# Patient Record
Sex: MALE | Race: BLACK OR AFRICAN AMERICAN
[De-identification: names, ages, dates, MRNs, and addresses within clinical notes are randomized per-mention and may not be internally consistent; named-entity substitution may affect disease eponyms.]

---

## 2017-10-18 ENCOUNTER — HOSPITAL ENCOUNTER (INPATIENT)
Dept: HOSPITAL 45 - C.EDB | Age: 22
LOS: 2 days | Discharge: HOME | DRG: 812 | End: 2017-10-20
Attending: FAMILY MEDICINE | Admitting: HOSPITALIST
Payer: COMMERCIAL

## 2017-10-18 VITALS
HEIGHT: 72 IN | WEIGHT: 174.61 LBS | HEIGHT: 72 IN | BODY MASS INDEX: 23.65 KG/M2 | WEIGHT: 174.61 LBS | BODY MASS INDEX: 23.65 KG/M2

## 2017-10-18 DIAGNOSIS — F17.200: ICD-10-CM

## 2017-10-18 DIAGNOSIS — D50.0: Primary | ICD-10-CM

## 2017-10-18 DIAGNOSIS — F98.8: ICD-10-CM

## 2017-10-18 LAB
ALBUMIN/GLOB SERPL: 1.2 {RATIO} (ref 0.9–2)
ALP SERPL-CCNC: 53 U/L (ref 45–117)
ALT SERPL-CCNC: 14 U/L (ref 12–78)
ANION GAP SERPL CALC-SCNC: 6 MMOL/L (ref 3–11)
ANISOCYTOSIS BLD QL SMEAR: PRESENT
AST SERPL-CCNC: 21 U/L (ref 15–37)
BASOPHILS # BLD: 0.03 K/UL (ref 0–0.2)
BASOPHILS NFR BLD: 0.4 %
BUN SERPL-MCNC: 8 MG/DL (ref 7–18)
BUN/CREAT SERPL: 8.1 (ref 10–20)
CALCIUM SERPL-MCNC: 9.2 MG/DL (ref 8.5–10.1)
CHLORIDE SERPL-SCNC: 105 MMOL/L (ref 98–107)
CO2 SERPL-SCNC: 26 MMOL/L (ref 21–32)
COMPLETE: YES
CREAT CL PREDICTED SERPL C-G-VRATE: 132.5 ML/MIN
CREAT SERPL-MCNC: 0.97 MG/DL (ref 0.6–1.4)
DACRYOCYTES BLD QL SMEAR: (no result)
EOSINOPHIL NFR BLD AUTO: 255 K/UL (ref 130–400)
GLOBULIN SER-MCNC: 3.6 GM/DL (ref 2.5–4)
GLUCOSE SERPL-MCNC: 96 MG/DL (ref 70–99)
HCT VFR BLD CALC: 25.2 % (ref 42–52)
HYPOCHROMIA BLD QL SMEAR: PRESENT
IG%: 0.3 %
IMM GRANULOCYTES NFR BLD AUTO: 38.6 %
LG PLATELETS BLD QL SMEAR: (no result)
LYME DISEASE AB IGG: (no result)
LYME DISEASE AB IGM: (no result)
LYMPHOCYTES # BLD: 2.73 K/UL (ref 1.2–3.4)
MAGNESIUM SERPL-MCNC: 2 MG/DL (ref 1.8–2.4)
MCH RBC QN AUTO: 13.2 PG (ref 25–34)
MCHC RBC AUTO-ENTMCNC: 25.8 G/DL (ref 32–36)
MCV RBC AUTO: 51.1 FL (ref 80–100)
MICROCYTES BLD QL SMEAR: PRESENT
MONOCYTES NFR BLD: 11.6 %
NEUTROPHILS # BLD AUTO: 2.1 %
NEUTROPHILS NFR BLD AUTO: 47 %
OVALOCYTES BLD QL SMEAR: (no result)
POIKILOCYTOSIS BLD QL SMEAR: PRESENT
POLYCHROMASIA BLD QL SMEAR: (no result)
POTASSIUM SERPL-SCNC: 3.5 MMOL/L (ref 3.5–5.1)
RBC # BLD AUTO: 4.93 M/UL (ref 4.7–6.1)
SCHISTOCYTES BLD QL SMEAR: (no result)
SODIUM SERPL-SCNC: 137 MMOL/L (ref 136–145)
TARGETS BLD QL SMEAR: (no result)
WBC # BLD AUTO: 7.08 K/UL (ref 4.8–10.8)

## 2017-10-18 NOTE — DIAGNOSTIC IMAGING REPORT
CT SCAN OF THE CERVICAL SPINE



CLINICAL HISTORY: Syncope. Neck pain.



COMPARISON STUDY:  No priors.



TECHNIQUE: CT scan of the cervical spine is performed from the skull base to the

upper thoracic spine. Images are reviewed in the axial, sagittal, and coronal

planes. IV contrast was not administered for this examination.  A dose lowering

technique was utilized adhering to the principles of ALARA.



CT DOSE: 1042.30 mGy.cm



FINDINGS:



Skeletal structures: The skeletal structures are well mineralized. There is no

evidence of fracture or subluxation involving the cervical spine. Vertebral body

height and alignment are maintained.  There is straightening of the cervical

lordosis with reversal centered at C4-C5. The odontoid process and lateral

masses are intact. The atlantoaxial articulation is preserved. The spinous

processes appear intact.



Intervertebral discs: The disc spaces are well maintained.



Central canal: Widely patent.



Soft tissues: The prevertebral and paraspinous soft tissues are within normal

limits.



Calvarium: The visualized calvarium at the skull base appears intact.



Brain parenchyma: Partially visualized brain parenchyma the skull base is within

normal limits.



Sinuses and mastoids: Trace mucosal thickening is seen in the maxillary antra.

The mastoid air cells are well pneumatized.



Lung apices: Clear as visualized.





IMPRESSION: There is no evidence of fracture or subluxation involving the

cervical spine.







Electronically signed by:  Arnold Giron M.D.

10/18/2017 10:46 PM



Dictated Date/Time:  10/18/2017 10:44 PM

## 2017-10-18 NOTE — DIAGNOSTIC IMAGING REPORT
CT SCAN OF THE BRAIN WITHOUT IV CONTRAST



CLINICAL HISTORY: Syncope. Headache.



COMPARISON STUDY:  No priors.



TECHNIQUE: Unenhanced axial CT scan of the brain is performed from the vertex to

the skull base.  A dose lowering technique was utilized adhering to the

principles of ALARA.



FINDINGS:



Brain parenchyma: The brain parenchyma is normal in appearance. There is no

hemorrhage, mass effect, or evidence of acute territorial ischemia by CT

criteria. Gray-white matter is preserved. No extra-axial fluid collection is

seen.



Ventricles, sulci, cisterns: Normal in configuration.



Intracranial vasculature: The visualized intracranial vasculature at the skull

base is normal in appearance.



Calvarium: There is no depressed calvarial fracture.



Sinuses and mastoids: The visualized paranasal sinuses are clear. The mastoid

air cells are well pneumatized.



Orbits: The bony orbits are grossly intact.





IMPRESSION: No acute intracranial abnormality.







Electronically signed by:  Arnold Giron M.D.

10/18/2017 10:43 PM



Dictated Date/Time:  10/18/2017 10:42 PM

## 2017-10-18 NOTE — HISTORY AND PHYSICAL
History & Physical


Date & Time of Service:


Oct 18, 2017 at 23:29


Chief Complaint:


Passing Out,Light Headed,Headache


Primary Care Physician:


No Doctor, Assigned


History of Present Illness


Source:  patient


This is a 23 yo m that is presenting to us after a syncopal episode which 

occurred yesterday. He states he was at a friend's house yesterday watching a 

basketball game. He had sudden onset dizziness while sitting in a chair and 

when he got up he had a syncopal episode. According to his friends he had been 

unconscious for 3-5 minutes and had no abnormal muscle movements. He awoke 

without confusion. Since this episode he has been feeling generally unwell with 

a mild headache and dizziness. Since this is ongoing he decided to come to the 

ED. The patient states that approx a year ago he started with vomiting 

frequently and there was no known source found for this vomiting. He states 

that sometimes he would have blood tinged saliva if he threw up and his stomach 

was empty. He also notes that this resolved over the summer but now he was 

suffering from different symptoms. Over the past few months he has had 

decreased exercise tolerance and in particular he has been unable to walk to 

class without shortness of breath. He has had no bloody stools but notes that 

his stools are darker in colour. He has no significant past medical history. 

Smokes infrequently and does not drink alcohol.





Past Medical/Surgical History


no known past medical history





Family History





Patient reports no known family medical history.





Social History


Smoking Status:  Current Some Day Smoker


Smokeless Tobacco Use:  No


Alcohol Use:  none


Drug Use:  marijuana


Marital Status:  single


Housing status:  lives with friends


Occupational Status:  [employer] student





Immunizations


History of Influenza Vaccine:  Unknown


History of Tetanus Vaccine?:  Unknown


History of Pneumococcal:  Unknown


History of Hepatitis B Vaccine:  Unknown





Multi-Drug Resistant Organisms


History of MDRO:  No





Allergies


Coded Allergies:  


     No Known Allergies (Unverified , 10/18/17)





Home Medications


Scheduled


Lisdexamfetamine Dimesylate (Vyvanse), 60 MG PO DAILY





Review of Systems


Constitutional:  No fever, No chills, No sweats


Eyes:  No worsening of vision, No eye pain


ENT:  No hearing loss


Respiratory:  + shortness of breath, + dyspnea on exertion, No cough, No sputum

, No wheezing, No dyspnea at rest


Cardiovascular:  No chest pain


Abdomen:  + nausea, + vomiting, + GI bleeding, No pain, No diarrhea, No 

constipation


Musculoskeletal:  No joint pain, No muscle pain


Genitourinary - Male:  No hematuria, No dysuria


Neurologic:  + weakness, No numbness/tingling, No balance problems


Psychiatric:  No depression symptoms, No anxiety


Endocrine:  + fatigue


Hematologic / Lymphatic:  No abnormal bleeding/bruising


Integumentary:  No rash, No new/changing skin lesions





Physical Exam


Vital Signs











  Date Time  Temp Pulse Resp B/P (MAP) Pulse Ox O2 Delivery O2 Flow Rate FiO2


 


10/18/17 23:14  77      


 


10/18/17 23:04     99 Room Air  


 


10/18/17 23:04  91 16 122/77 99 Room Air  


 


10/18/17 22:23  65  136/54 98 Room Air  





  72  114/74    





  80  103/74    


 


10/18/17 21:52 37.2 60 18 117/59 100 Room Air  








General Appearance:  no apparent distress


Head:  normocephalic, atraumatic


Eyes:  normal inspection


ENT:  normal ENT inspection


Neck:  supple


Respiratory/Chest:  normal breath sounds, no respiratory distress, no accessory 

muscle use


Cardiovascular:  regular rate, rhythm, no murmur, normal peripheral pulses


Abdomen/GI:  normal bowel sounds, non tender, soft, + fecal occult blood (per 

ED evaluation)


Back:  normal inspection


Extremities/Musculoskelatal:  no calf tenderness, no pedal edema, normal range 

of motion


Neurologic/Psych:  no motor/sensory deficits, alert, normal mood/affect, 

oriented x 3


Skin:  normal color, warm/dry, no rash


Lymphatic:  no adenopathy





Diagnostics


Laboratory Results





Results Past 24 Hours








Test


  10/18/17


22:17 Range/Units


 


 


White Blood Count 7.08 4.8-10.8  K/uL


 


Red Blood Count 4.93 4.7-6.1  M/uL


 


Hemoglobin 6.5 14.0-18.0  g/dL


 


Hematocrit 25.2 42-52  %


 


Mean Corpuscular Volume 51.1   fL


 


Mean Corpuscular Hemoglobin 13.2 25-34  pg


 


Mean Corpuscular Hemoglobin


Concent 25.8


  32-36  g/dl


 


 


Platelet Count 255 130-400  K/uL


 


Neutrophils (%) (Auto) 47.0  %


 


Lymphocytes (%) (Auto) 38.6  %


 


Monocytes (%) (Auto) 11.6  %


 


Eosinophils (%) (Auto) 2.1  %


 


Basophils (%) (Auto) 0.4  %


 


Neutrophils # (Auto) 3.33 1.4-6.5  K/uL


 


Lymphocytes # (Auto) 2.73 1.2-3.4  K/uL


 


Monocytes # (Auto) 0.82 0.11-0.59  K/uL


 


Eosinophils # (Auto) 0.15 0-0.5  K/uL


 


Basophils # (Auto) 0.03 0-0.2  K/uL


 


RDW Standard Deviation 37.6 36.4-46.3  fL


 


RDW Coefficient of Variation 20.6 11.5-14.5  %


 


Immature Granulocyte % (Auto) 0.3  %


 


Immature Granulocyte # (Auto) 0.02 0.00-0.02  K/uL


 


Large Platelets 1+  


 


Polychromasia 1+  


 


Hypochromasia PRESENT  


 


Poikilocytosis PRESENT  


 


Anisocytosis PRESENT  


 


Microcytosis PRESENT  


 


Target Cells 1+  


 


Tear Drop Cells 1+  


 


Ovalocytes 1+  


 


Schistocytes 1+  


 


Sodium Level 137 136-145  mmol/L


 


Potassium Level 3.5 3.5-5.1  mmol/L


 


Chloride Level 105   mmol/L


 


Carbon Dioxide Level 26 21-32  mmol/L


 


Anion Gap 6.0 3-11  mmol/L


 


Blood Urea Nitrogen 8 7-18  mg/dl


 


Creatinine


  0.97


  0.60-1.40


mg/dl


 


Est Creatinine Clear Calc


Drug Dose 132.5


   ml/min


 


 


Estimated GFR (


American) 129.7


   


 


 


Estimated GFR (Non-


American 111.9


   


 


 


BUN/Creatinine Ratio 8.1 10-20  


 


Random Glucose 96 70-99  mg/dl


 


Calcium Level 9.2 8.5-10.1  mg/dl


 


Magnesium Level 2.0 1.8-2.4  mg/dl


 


Total Bilirubin 0.4 0.2-1  mg/dl


 


Aspartate Amino Transf


(AST/SGOT) 21


  15-37  U/L


 


 


Alanine Aminotransferase


(ALT/SGPT) 14


  12-78  U/L


 


 


Alkaline Phosphatase 53   U/L


 


Troponin I < 0.015 0-0.045  ng/ml


 


Total Protein 8.1 6.4-8.2  gm/dl


 


Albumin 4.5 3.4-5.0  gm/dl


 


Globulin 3.6 2.5-4.0  gm/dl


 


Albumin/Globulin Ratio 1.2 0.9-2  


 


Lyme Disease IgG Antibody NEG NEG  


 


Lyme Disease IgM Antibody NEG NEG  











Diagnostic Radiology


CT SCAN OF THE CERVICAL SPINE





CLINICAL HISTORY: Syncope. Neck pain.





COMPARISON STUDY:  No priors.





TECHNIQUE: CT scan of the cervical spine is performed from the skull base to the


upper thoracic spine. Images are reviewed in the axial, sagittal, and coronal


planes. IV contrast was not administered for this examination.  A dose lowering


technique was utilized adhering to the principles of ALARA.





CT DOSE: 1042.30 mGy.cm





FINDINGS:





Skeletal structures: The skeletal structures are well mineralized. There is no


evidence of fracture or subluxation involving the cervical spine. Vertebral body


height and alignment are maintained.  There is straightening of the cervical


lordosis with reversal centered at C4-C5. The odontoid process and lateral


masses are intact. The atlantoaxial articulation is preserved. The spinous


processes appear intact.





Intervertebral discs: The disc spaces are well maintained.





Central canal: Widely patent.





Soft tissues: The prevertebral and paraspinous soft tissues are within normal


limits.





Calvarium: The visualized calvarium at the skull base appears intact.





Brain parenchyma: Partially visualized brain parenchyma the skull base is within


normal limits.





Sinuses and mastoids: Trace mucosal thickening is seen in the maxillary antra.


The mastoid air cells are well pneumatized.





Lung apices: Clear as visualized.








IMPRESSION: There is no evidence of fracture or subluxation involving the


cervical spine.





CT SCAN OF THE BRAIN WITHOUT IV CONTRAST





CLINICAL HISTORY: Syncope. Headache.





COMPARISON STUDY:  No priors.





TECHNIQUE: Unenhanced axial CT scan of the brain is performed from the vertex to


the skull base.  A dose lowering technique was utilized adhering to the


principles of ALARA.





FINDINGS:





Brain parenchyma: The brain parenchyma is normal in appearance. There is no


hemorrhage, mass effect, or evidence of acute territorial ischemia by CT


criteria. Gray-white matter is preserved. No extra-axial fluid collection is


seen.





Ventricles, sulci, cisterns: Normal in configuration.





Intracranial vasculature: The visualized intracranial vasculature at the skull


base is normal in appearance.





Calvarium: There is no depressed calvarial fracture.





Sinuses and mastoids: The visualized paranasal sinuses are clear. The mastoid


air cells are well pneumatized.





Orbits: The bony orbits are grossly intact.








IMPRESSION: No acute intracranial abnormality.





Impression


Assessment and Plan


This is a 23 yo m that is suffering from symptomatic anemia with a likely GI 

source considering grossly positive stool occult. Patient is consented for 

blood transfusion and protonix drip initiated. 





Microcytic anemia secondary to acute GI losses


- tele admission


- The microcytic nature of the anemia makes it suspicious that the patient has 

been suffering from GI losses for an extended period of time


   - will also assess iron studies as well as folate and vit b 12


- Blood type and cross; 4 units ordered 


- HH q 4h 


- protonix drip 


- Consult GI 


- CBC and CMP in the am 


- NPO 





DVt prophylaxis


- chemical prophylaxis is contra; SCD








Attending Addendum:








I have physically seen and examined this patient, have directed the resident's 

medical activities, and agree with the H&P as noted above with the following 

exceptions as noted.





The patient is awake, alert and oriented 3, well-developed and well-nourished, 

normocephalic and atraumatic, lying in bed and in no acute distress.





HEENT--PERRL, EOMI, mucous membranes  and oropharynx dry.


Neck--supple, no JVD or bruits, thyroid normal, trachea midline, no adenopathy.


Heart--normal S1 and S2, no extra beats, no murmurs, rubs or gallops.


Lungs--clear bilaterally with good air movement, no respiratory distress, no 

accessory muscle use.


Abdomen--normal bowel sounds and soft, nontender and nondistended, no hernias 

or masses,  no organomegaly.


Extremities--no cyanosis, clubbing or edema. There are good distal pulses b/l.


Dermatologic--normal skin turgor, normal color, warm and dry, no abnormal lymph 

nodes, no rash.


Neurologic--cranial nerves II through XII grossly intact.


Rheumatologic--normal range of motion.


Psychiatric--normal affect.








Assessment and Plan:





Hypochromic, microcytic anemia/syncopal episode--


The patient will be admitted to the telemetry unit for close blood pressure 

monitoring.


Underwent type and cross in the ED.


H&H every 4 hours.


Protonix bolus and drip.


Nothing by mouth status.


Add a Sedimentation rate, vitamin B-12, folic acid, ferritin, TIBC, 

reticulocyte count, peripheral smear and hemoglobin electrophoresis.


Consult gastroenterology for possible EGD.





ADHD--


Hold Vyvanse while nothing by mouth.











Level of Care


Telemetry





Advanced Directives


Existing Advance Directive:  No


Existing Living Will:  No


Existing Power of :  No





Resuscitation Status


FULL RESUSCITATION





VTE Prophylaxis


VTE Risk Assessment Done? Y/N:  Yes


Risk Level:  Moderate


Given or contraindicated:  SCD's





Social Service Consult


None Apply





Note


Total Time:   Critical Care 30 - 74 minutes

## 2017-10-19 VITALS
TEMPERATURE: 98.42 F | HEART RATE: 62 BPM | DIASTOLIC BLOOD PRESSURE: 57 MMHG | OXYGEN SATURATION: 100 % | SYSTOLIC BLOOD PRESSURE: 114 MMHG

## 2017-10-19 VITALS
DIASTOLIC BLOOD PRESSURE: 62 MMHG | HEART RATE: 64 BPM | OXYGEN SATURATION: 100 % | TEMPERATURE: 98.42 F | SYSTOLIC BLOOD PRESSURE: 117 MMHG

## 2017-10-19 VITALS
OXYGEN SATURATION: 100 % | SYSTOLIC BLOOD PRESSURE: 118 MMHG | HEART RATE: 55 BPM | DIASTOLIC BLOOD PRESSURE: 71 MMHG | TEMPERATURE: 98.24 F

## 2017-10-19 VITALS
DIASTOLIC BLOOD PRESSURE: 54 MMHG | SYSTOLIC BLOOD PRESSURE: 118 MMHG | OXYGEN SATURATION: 100 % | HEART RATE: 56 BPM | TEMPERATURE: 98.78 F

## 2017-10-19 VITALS
TEMPERATURE: 98.06 F | OXYGEN SATURATION: 100 % | SYSTOLIC BLOOD PRESSURE: 110 MMHG | HEART RATE: 72 BPM | DIASTOLIC BLOOD PRESSURE: 65 MMHG

## 2017-10-19 VITALS
TEMPERATURE: 98.24 F | HEART RATE: 70 BPM | SYSTOLIC BLOOD PRESSURE: 114 MMHG | OXYGEN SATURATION: 100 % | DIASTOLIC BLOOD PRESSURE: 71 MMHG

## 2017-10-19 VITALS
OXYGEN SATURATION: 100 % | TEMPERATURE: 98.42 F | SYSTOLIC BLOOD PRESSURE: 117 MMHG | DIASTOLIC BLOOD PRESSURE: 60 MMHG | HEART RATE: 68 BPM

## 2017-10-19 VITALS — SYSTOLIC BLOOD PRESSURE: 117 MMHG | HEART RATE: 63 BPM | DIASTOLIC BLOOD PRESSURE: 71 MMHG

## 2017-10-19 VITALS
OXYGEN SATURATION: 100 % | DIASTOLIC BLOOD PRESSURE: 71 MMHG | HEART RATE: 62 BPM | TEMPERATURE: 98.06 F | SYSTOLIC BLOOD PRESSURE: 126 MMHG

## 2017-10-19 VITALS
OXYGEN SATURATION: 100 % | SYSTOLIC BLOOD PRESSURE: 121 MMHG | TEMPERATURE: 98.42 F | HEART RATE: 70 BPM | DIASTOLIC BLOOD PRESSURE: 71 MMHG

## 2017-10-19 VITALS
SYSTOLIC BLOOD PRESSURE: 112 MMHG | HEART RATE: 60 BPM | DIASTOLIC BLOOD PRESSURE: 72 MMHG | TEMPERATURE: 97.7 F | OXYGEN SATURATION: 99 %

## 2017-10-19 VITALS
SYSTOLIC BLOOD PRESSURE: 102 MMHG | TEMPERATURE: 98.24 F | DIASTOLIC BLOOD PRESSURE: 72 MMHG | HEART RATE: 57 BPM | OXYGEN SATURATION: 100 %

## 2017-10-19 VITALS
TEMPERATURE: 98.24 F | SYSTOLIC BLOOD PRESSURE: 110 MMHG | HEART RATE: 62 BPM | DIASTOLIC BLOOD PRESSURE: 65 MMHG | OXYGEN SATURATION: 100 %

## 2017-10-19 VITALS
DIASTOLIC BLOOD PRESSURE: 60 MMHG | SYSTOLIC BLOOD PRESSURE: 113 MMHG | TEMPERATURE: 98.42 F | HEART RATE: 61 BPM | OXYGEN SATURATION: 100 %

## 2017-10-19 VITALS
OXYGEN SATURATION: 100 % | SYSTOLIC BLOOD PRESSURE: 117 MMHG | TEMPERATURE: 98.42 F | DIASTOLIC BLOOD PRESSURE: 62 MMHG | HEART RATE: 64 BPM

## 2017-10-19 VITALS — OXYGEN SATURATION: 100 %

## 2017-10-19 LAB
FERRITIN SERPL-MCNC: 1.2 NG/ML (ref 8–388)
HCT VFR BLD CALC: 21.7 % (ref 42–52)
HCT VFR BLD CALC: 26.5 % (ref 42–52)
HCT VFR BLD CALC: 26.7 % (ref 42–52)
HCT VFR BLD CALC: 26.8 % (ref 42–52)
TIBC SERPL-MCNC: 423 MCG/DL (ref 250–450)

## 2017-10-19 RX ADMIN — PANTOPRAZOLE SODIUM SCH MLS/HR: 40 INJECTION, POWDER, FOR SOLUTION INTRAVENOUS at 23:59

## 2017-10-19 RX ADMIN — PANTOPRAZOLE SODIUM SCH MLS/HR: 40 INJECTION, POWDER, FOR SOLUTION INTRAVENOUS at 18:56

## 2017-10-19 RX ADMIN — PANTOPRAZOLE SODIUM SCH MLS/HR: 40 INJECTION, POWDER, FOR SOLUTION INTRAVENOUS at 13:50

## 2017-10-19 RX ADMIN — PANTOPRAZOLE SODIUM SCH MLS/HR: 40 INJECTION, POWDER, FOR SOLUTION INTRAVENOUS at 03:15

## 2017-10-19 RX ADMIN — PANTOPRAZOLE SODIUM SCH MLS/HR: 40 INJECTION, POWDER, FOR SOLUTION INTRAVENOUS at 08:35

## 2017-10-19 NOTE — FAMILY MEDICINE PROGRESS NOTE
Progress Note


Date of Service


Oct 19, 2017.





Subjective


Pt evaluation today including:  conversation w/ patient, conversation w/ family

, physical exam, chart review, lab review, review of studies, conversation w/ 

consultant, review of inpatient medication list


Patient feels better since admission particularly post transfusion of 2 units 

of packed red blood cells.  He was previously felt very easily short of breath 

and could feel his heart racing with minimal exertion, but currently denies 

these symptoms.  He otherwise denies headaches, lightheadedness, palpitations, 

chest pain, numbness and tingling.  As per telemetry reading, he has a low 

heart rate, however on speaking to the patient, he shares that his PCP has 

always found his heart rates to be low, in the 60s.  Patient states he has no 

appetite currently.  He noticed his stools were dark over the last 3-4 months.  

Has not had any constipation or diarrhea.





   All Other Systems:  Reviewed and Negative





Objective


Vital Signs











  Date Time  Temp Pulse Resp B/P (MAP) Pulse Ox O2 Delivery O2 Flow Rate FiO2


 


10/19/17 07:40 36.8 62 16 110/65 100   


 


10/19/17 06:30 36.8 70 16 114/71 100   


 


10/19/17 05:30 36.9 61 16 113/60 100   


 


10/19/17 05:00 36.9 68 16 117/60 100   


 


10/19/17 04:45 36.9 64 18 117/62 100   


 


10/19/17 04:27 36.9 64 18 117/62 (80) 100 Room Air  


 


10/19/17 04:00     100 Room Air  


 


10/19/17 00:16 36.9 70 16 121/71 100 Room Air  


 


10/18/17 23:50  93 24 178/84 100 Room Air  


 


10/18/17 23:14  77      


 


10/18/17 23:04     99 Room Air  


 


10/18/17 23:04  91 16 122/77 99 Room Air  


 


10/18/17 22:23  65  136/54 98 Room Air  





  72  114/74    





  80  103/74    


 


10/18/17 21:52 37.2 60 18 117/59 100 Room Air  











Physical Exam


General Appearance:  WD/WN, no apparent distress


Eyes:  normal inspection, + pertinent finding (mild conjunctival pallor)


ENT:  hearing grossly normal


Neck:  supple, no adenopathy


Respiratory/Chest:  lungs clear, normal breath sounds, no respiratory distress, 

no accessory muscle use


Cardiovascular:  regular rate, rhythm, no murmur


Abdomen:  normal bowel sounds, soft, + tenderness (epigastric)


Extremities:  no pedal edema, no calf tenderness


Neurologic/Psychiatric:  alert, normal mood/affect, oriented x 3


Skin:  normal color, warm/dry, no rash





Laboratory Results





Results Past 24 Hours








Test


  10/18/17


22:17 10/19/17


03:44 10/19/17


11:53 10/19/17


16:08 Range/Units


 


 


White Blood Count 7.08    4.8-10.8  K/uL


 


Red Blood Count 4.93    4.7-6.1  M/uL


 


Hemoglobin 6.5 5.5 7.5 7.4 14.0-18.0  g/dL


 


Hematocrit 25.2 21.7 26.8 26.5 42-52  %


 


Mean Corpuscular Volume 51.1      fL


 


Mean Corpuscular Hemoglobin 13.2    25-34  pg


 


Mean Corpuscular Hemoglobin


Concent 25.8


  


  


  


  32-36  g/dl


 


 


Platelet Count 255    130-400  K/uL


 


Neutrophils (%) (Auto) 47.0     %


 


Lymphocytes (%) (Auto) 38.6     %


 


Monocytes (%) (Auto) 11.6     %


 


Eosinophils (%) (Auto) 2.1     %


 


Basophils (%) (Auto) 0.4     %


 


Neutrophils # (Auto) 3.33    1.4-6.5  K/uL


 


Lymphocytes # (Auto) 2.73    1.2-3.4  K/uL


 


Monocytes # (Auto) 0.82    0.11-0.59  K/uL


 


Eosinophils # (Auto) 0.15    0-0.5  K/uL


 


Basophils # (Auto) 0.03    0-0.2  K/uL


 


RDW Standard Deviation 37.6    36.4-46.3  fL


 


RDW Coefficient of Variation 20.6    11.5-14.5  %


 


Immature Granulocyte % (Auto) 0.3     %


 


Immature Granulocyte # (Auto) 0.02    0.00-0.02  K/uL


 


Large Platelets 1+     


 


Polychromasia 1+     


 


Hypochromasia PRESENT     


 


Poikilocytosis PRESENT     


 


Anisocytosis PRESENT     


 


Microcytosis PRESENT     


 


Target Cells 1+     


 


Tear Drop Cells 1+     


 


Ovalocytes 1+     


 


Schistocytes 1+     


 


Sodium Level 137    136-145  mmol/L


 


Potassium Level 3.5    3.5-5.1  mmol/L


 


Chloride Level 105      mmol/L


 


Carbon Dioxide Level 26    21-32  mmol/L


 


Anion Gap 6.0    3-11  mmol/L


 


Blood Urea Nitrogen 8    7-18  mg/dl


 


Creatinine


  0.97


  


  


  


  0.60-1.40


mg/dl


 


Est Creatinine Clear Calc


Drug Dose 132.5


  


  


  


   ml/min


 


 


Estimated GFR (


American) 129.7


  


  


  


   


 


 


Estimated GFR (Non-


American 111.9


  


  


  


   


 


 


BUN/Creatinine Ratio 8.1    10-20  


 


Random Glucose 96    70-99  mg/dl


 


Calcium Level 9.2    8.5-10.1  mg/dl


 


Magnesium Level 2.0    1.8-2.4  mg/dl


 


Total Bilirubin 0.4    0.2-1  mg/dl


 


Aspartate Amino Transf


(AST/SGOT) 21


  


  


  


  15-37  U/L


 


 


Alanine Aminotransferase


(ALT/SGPT) 14


  


  


  


  12-78  U/L


 


 


Alkaline Phosphatase 53      U/L


 


Troponin I < 0.015    0-0.045  ng/ml


 


Total Protein 8.1    6.4-8.2  gm/dl


 


Albumin 4.5    3.4-5.0  gm/dl


 


Globulin 3.6    2.5-4.0  gm/dl


 


Albumin/Globulin Ratio 1.2    0.9-2  


 


Lyme Disease IgG Antibody NEG    NEG  


 


Lyme Disease IgM Antibody NEG    NEG  


 


Peripheral Blood Smear Path


Consult 


  


  


  


   


 


 


Absolute Reticulocyte Count


  


  0.04


  


  


  0.02-0.10


10^6/uL


 


Percent Reticulocyte Count  0.9   0.5-2.0  %


 


Iron Level  6     mcg/dl


 


Total Iron Binding Capacity  423   250-450  mcg/dl


 


Transferrin  324   200-360  mg/dl


 


Transferrin % Saturation  1   20-50  %


 


Ferritin


  


  1.2


  


  


  8.0-388.0


ng/ml


 


Vitamin B12 Level  509   211-911  pg/mL


 


Folate  23.74   >5.38  ng/mL


 


Immunoglobulin A   154.0    mg/dL











Assessment and Plan


21 yo M with h/o of gastritis admitted with symptomatic anemia, Hb 5.7, with a 

likely GI source considering grossly positive stool occult. Patient is 

consented for blood transfusion and Protonix drip initiated. 





Anemia - microcytic, iron studies consistent with iron deficiency anemia likely 

secondary to chronic GI losses. S/p transfusion of 2 units pRBC and IV Venofer 

300mg


- Post transfusion Hb 7.5. Continue HH q8h 


- Continue Protonix drip 


- GI consulted - keep NPO until GI assessment


- IgA transglutaminase ordered - pending results. 





ADD


- Vyvance held





DVT prophylaxis


- Chemical prophylaxis is contra


- SCD


Discharge planning:  home





Resident Tracking


Resident Involvement:  Resident Care Provided


Care Provided:  Adult Hospital Medicine





Reviewed:  Pt Seen/Exam by Me


History


feeling tired. denies any abdominal pain or andrea rectal bleeding


receiving blood transfusion at the time of visit


Constitutional:  denies: fever


Respiratory:  negative: short of breath


Cardiovascular:  denies chest pain


General Appearance:  no apparent distress


Respiratory:  lungs clear, no respiratory distress


Cardiovascular:  regular rate, rhythm


Neurologic/Psychiatric:  alert, oriented x 3


Skin Characteristics:  warm/dry


Assessment/Plan


Resident Physician Supervision Note:


I independently interviewed and examined the patient and verified the key 

history and physical, reviewed labs and image studies, discussed the case with 

the resident Dr. Yadav and agree with the findings and care plan.

## 2017-10-19 NOTE — EMERGENCY ROOM VISIT NOTE
History


First contact with patient:  21:58


Chief Complaint:  HEADACHE


Stated Complaint:  ANEMIA, POSITIVE OCCULT STOOL BLOOD TEST





History of Present Illness


The patient is a 22 year old male who presents to the Emergency Room via 

private vehicle accompanied by female with complaints of "passing out, 

lightheaded, headache".  The patient states that yesterday around 10 PM, he was 

at a friend's house, and felt lightheaded.  He states he felt very thirsty, and 

as if the blood was rushing out of his brain.  He then notes he woke up on the 

floor with his friends over top of them noting that he was unconscious for 

about 2 minutes.  He notes they called 911, but he did not go to the hospital.  

He states that he was at urgent care today, because he has pain in the back of 

his head and neck from where he struck the ground.  They thought that maybe saw 

on equal pupils, therefore sent him here for further evaluation and management.

  He states that at this time he denies any fevers, chills, chest pain, 

shortness of breath.





Review of Systems


A complete 10-point Review of Systems was discussed with the patient, with 

pertinent positives and negatives listed in the History of Present Illness. All 

remaining Review of Systems questions can be considered negative unless 

otherwise specified.





Past Medical/Surgical History


Medical Problems:


(1) Anemia


(2) Positive occult stool blood test








Family History





Patient reports no known family medical history.


Heart disease, cancer.





Social History


Smoking Status:  Current Some Day Smoker


Smokeless Tobacco Use:  No


Drug Use:  marijuana


Marital Status:  single


Occupation Status:  [employer] student





Current/Historical Medications


Scheduled


Lisdexamfetamine Dimesylate (Vyvanse), 60 MG PO DAILY





Physical Exam


Vital Signs











  Date Time  Temp Pulse Resp B/P (MAP) Pulse Ox O2 Delivery O2 Flow Rate FiO2


 


10/18/17 23:14  77      


 


10/18/17 23:04     99 Room Air  


 


10/18/17 23:04  91 16 122/77 99 Room Air  


 


10/18/17 22:23  65  136/54 98 Room Air  





  72  114/74    





  80  103/74    


 


10/18/17 21:52 37.2 60 18 117/59 100 Room Air  











Physical Exam


VITAL SIGNS - Vital signs and nursing notes were reviewed.  Stable.


GENERAL -22-year-old male appearing his stated age who is in no acute distress. 

Communicates well with provider and answers questions appropriately.


SKIN - Without rashes.  No petechial rashes.


HEAD - NC/AT.


EYES - PERRL with EOMI bilaterally. Sclera slightly icteric around the iris. 

Palpebral conjunctiva pink and moist with no injection noted.


EARS - No deformities of external structures noted on gross examination 

bilaterally. No pain elicited with palpation of the tragus bilaterally. 

External auditory canals without discharge or otorrhea. Tympanic membranes 

pearly gray without retraction or bulging. No fluid or purulent material 

visualized behind the TM. Handle of malleus, umbo, cone of light, pars tensa/

flaccid all easily visualized.  No hemotympanum


NOSE - Midline and without cyanosis. No epistaxis or purulent drainage noted.  

No hyphema.


MOUTH/OROPHARYNX - Without perioral cyanosis. Buccal mucosa pink and moist and 

without leukoplakia. Tongue midline with equal elevation of palate bilaterally. 

No tonsillar hypertrophy, erythema, or exudates noted.  Fair dentition noted.


NECK - Neck with FROM. Supple to palpation.  No lymphadenopathy noted. No 

nuchal rigidity.  No C-spine tenderness.  There is tenderness to palpation 

overlying the patient's left medial paraspinous musculature.


LUNGS - Chest wall symmetric without accessory muscle use, intercostals 

retractions, or central cyanosis. Normal vesicular breath sounds CTA B/L. No 

wheezes, rales, or rhonchi appreciated.


CARDIAC - RRR with S1/S2. No murmur, rubs, or gallops appreciated. 


ABDOMEN - Abdominal contour normal without pulsations or visible masses. BS 

normoactive all four quadrants. No tenderness, palpable masses, 

hepatosplenomegaly, or ascites noted.


EXTREMITIES - No clubbing or peripheral cyanosis. No pretibial edema present.  +

5/5 strength noted in UE/LE bilaterally.


NEUROLOGIC - Cranial nerves II through XII grossly intact. Sensory intact to 

light touch throughout. 


PSYCH - A&O,  and cooperates fully with examiner. Pt is very pleasant and 

interacts well with examiner.





RECTAL: No bright red blood.  Hemoccult positive.





Medical Decision & Procedures


ER Provider


Diagnostic Interpretation:


CT SCAN OF THE BRAIN WITHOUT IV CONTRAST





CLINICAL HISTORY: Syncope. Headache.





COMPARISON STUDY:  No priors.





TECHNIQUE: Unenhanced axial CT scan of the brain is performed from the vertex to


the skull base.  A dose lowering technique was utilized adhering to the


principles of ALARA.





FINDINGS:





Brain parenchyma: The brain parenchyma is normal in appearance. There is no


hemorrhage, mass effect, or evidence of acute territorial ischemia by CT


criteria. Gray-white matter is preserved. No extra-axial fluid collection is


seen.





Ventricles, sulci, cisterns: Normal in configuration.





Intracranial vasculature: The visualized intracranial vasculature at the skull


base is normal in appearance.





Calvarium: There is no depressed calvarial fracture.





Sinuses and mastoids: The visualized paranasal sinuses are clear. The mastoid


air cells are well pneumatized.





Orbits: The bony orbits are grossly intact.








IMPRESSION: No acute intracranial abnormality.











Electronically signed by:  Arnold Giron M.D.


10/18/2017 10:43 PM





Dictated Date/Time:  10/18/2017 10:42 PM





CT SCAN OF THE CERVICAL SPINE





CLINICAL HISTORY: Syncope. Neck pain.





COMPARISON STUDY:  No priors.





TECHNIQUE: CT scan of the cervical spine is performed from the skull base to the


upper thoracic spine. Images are reviewed in the axial, sagittal, and coronal


planes. IV contrast was not administered for this examination.  A dose lowering


technique was utilized adhering to the principles of ALARA.





CT DOSE: 1042.30 mGy.cm





FINDINGS:





Skeletal structures: The skeletal structures are well mineralized. There is no


evidence of fracture or subluxation involving the cervical spine. Vertebral body


height and alignment are maintained.  There is straightening of the cervical


lordosis with reversal centered at C4-C5. The odontoid process and lateral


masses are intact. The atlantoaxial articulation is preserved. The spinous


processes appear intact.





Intervertebral discs: The disc spaces are well maintained.





Central canal: Widely patent.





Soft tissues: The prevertebral and paraspinous soft tissues are within normal


limits.





Calvarium: The visualized calvarium at the skull base appears intact.





Brain parenchyma: Partially visualized brain parenchyma the skull base is within


normal limits.





Sinuses and mastoids: Trace mucosal thickening is seen in the maxillary antra.


The mastoid air cells are well pneumatized.





Lung apices: Clear as visualized.








IMPRESSION: There is no evidence of fracture or subluxation involving the


cervical spine.











Electronically signed by:  Arnold Giron M.D.


10/18/2017 10:46 PM





Dictated Date/Time:  10/18/2017 10:44 PM





Laboratory Results


10/18/17 22:17








Red Blood Count 4.93, Mean Corpuscular Volume 51.1, Mean Corpuscular Hemoglobin 

13.2, Mean Corpuscular Hemoglobin Concent 25.8, Neutrophils (%) (Auto) 47.0, 

Lymphocytes (%) (Auto) 38.6, Monocytes (%) (Auto) 11.6, Eosinophils (%) (Auto) 

2.1, Basophils (%) (Auto) 0.4, Neutrophils # (Auto) 3.33, Lymphocytes # (Auto) 

2.73, Monocytes # (Auto) 0.82, Eosinophils # (Auto) 0.15, Basophils # (Auto) 

0.03





10/18/17 22:17

















Test


  10/18/17


22:17


 


White Blood Count


  7.08 K/uL


(4.8-10.8)


 


Red Blood Count


  4.93 M/uL


(4.7-6.1)


 


Hemoglobin


  6.5 g/dL


(14.0-18.0)


 


Hematocrit 25.2 % (42-52) 


 


Mean Corpuscular Volume


  51.1 fL


()


 


Mean Corpuscular Hemoglobin


  13.2 pg


(25-34)


 


Mean Corpuscular Hemoglobin


Concent 25.8 g/dl


(32-36)


 


Platelet Count


  255 K/uL


(130-400)


 


Neutrophils (%) (Auto) 47.0 % 


 


Lymphocytes (%) (Auto) 38.6 % 


 


Monocytes (%) (Auto) 11.6 % 


 


Eosinophils (%) (Auto) 2.1 % 


 


Basophils (%) (Auto) 0.4 % 


 


Neutrophils # (Auto)


  3.33 K/uL


(1.4-6.5)


 


Lymphocytes # (Auto)


  2.73 K/uL


(1.2-3.4)


 


Monocytes # (Auto)


  0.82 K/uL


(0.11-0.59)


 


Eosinophils # (Auto)


  0.15 K/uL


(0-0.5)


 


Basophils # (Auto)


  0.03 K/uL


(0-0.2)


 


RDW Standard Deviation


  37.6 fL


(36.4-46.3)


 


RDW Coefficient of Variation


  20.6 %


(11.5-14.5)


 


Immature Granulocyte % (Auto) 0.3 % 


 


Immature Granulocyte # (Auto)


  0.02 K/uL


(0.00-0.02)


 


Large Platelets 1+ 


 


Polychromasia 1+ 


 


Hypochromasia PRESENT 


 


Poikilocytosis PRESENT 


 


Anisocytosis PRESENT 


 


Microcytosis PRESENT 


 


Target Cells 1+ 


 


Tear Drop Cells 1+ 


 


Ovalocytes 1+ 


 


Schistocytes 1+ 


 


Anion Gap


  6.0 mmol/L


(3-11)


 


Est Creatinine Clear Calc


Drug Dose 132.5 ml/min 


 


 


Estimated GFR (


American) 129.7 


 


 


Estimated GFR (Non-


American 111.9 


 


 


BUN/Creatinine Ratio 8.1 (10-20) 


 


Calcium Level


  9.2 mg/dl


(8.5-10.1)


 


Magnesium Level


  2.0 mg/dl


(1.8-2.4)


 


Total Bilirubin


  0.4 mg/dl


(0.2-1)


 


Aspartate Amino Transf


(AST/SGOT) 21 U/L (15-37) 


 


 


Alanine Aminotransferase


(ALT/SGPT) 14 U/L (12-78) 


 


 


Alkaline Phosphatase


  53 U/L


()


 


Troponin I


  < 0.015 ng/ml


(0-0.045)


 


Total Protein


  8.1 gm/dl


(6.4-8.2)


 


Albumin


  4.5 gm/dl


(3.4-5.0)


 


Globulin


  3.6 gm/dl


(2.5-4.0)


 


Albumin/Globulin Ratio 1.2 (0.9-2) 


 


Lyme Disease IgG Antibody NEG (NEG) 


 


Lyme Disease IgM Antibody NEG (NEG) 











Medications Administered











 Medications


  (Trade)  Dose


 Ordered  Sig/Jos


 Route  Start Time


 Stop Time Status Last Admin


Dose Admin


 


 Sodium Chloride  1,000 ml @ 


 999 mls/hr  Q1H1M STAT


 IV  10/18/17 22:53


 10/18/17 23:53 DC 10/18/17 23:02


999 MLS/HR


 


 Pantoprazole


 Sodium 80 mg/


 Dextrose  120 ml @ 


 480 mls/hr  NOW  STAT


 IV  10/18/17 23:28


 10/18/17 23:42 DC 10/18/17 23:39


480 MLS/HR











Medical Decision


Patient was seen and evaluated as above.  He presents to us today status post a 

syncopal event with head pain.  He has been referred by [facility].  CT scan 

of patient's head and neck were obtained, as well as baseline labs. Ct 

negative. Patient's EKG reveals normal sinus rhythm, and questionable left 

ventricular hypertrophy, which could be secondary to his body habitus as well 

as early repolarization.  Patient's metabolic panel is essentially unremarkable

, however when his CBC resulted, his hemoglobin was found to be 6.5.  He was 

given a liter of normal saline, and that was when he was questioned about any 

blood loss.  He notes that he has had hematemesis for the past 1.5 years, but 

is been off and on.  There is none here in the emergency department.  He 

consented to Hemoccult testing/rectal exam, and although there was no bright 

red blood, there certainly was heme positive stool with Hemoccult test. Stool 

light brown.  I suspect he may be experiencing an upper GI bleed.  Protonix 

drip was ordered.  Another liter of fluids was ordered.  Type and cross for 4 

units was also ordered.  Case was discussed with the attending physician.  

Decision was made to admit the patient for further evaluation and management.  

Please refer to further documentation regarding his stay.





He does have orthostatic vital signs.





In evaluation treatment this patient the following differential diagnoses were 

entertained: Anemia, upper GI bleed, gastric ulcer, vasovagal syncope, 

orthostatic vitals, among others.





Impression





 Primary Impression:  


 Syncopal episodes


 Additional Impressions:  


 Headache


 Anemia


 Positive occult stool blood test





Departure Information


Dispostion


Admitted as an inpatient





Condition


FAIR





Referrals


No Doctor, Assigned (PCP)





Patient Instructions


My [facility]





Problem Qualifiers

## 2017-10-20 VITALS
SYSTOLIC BLOOD PRESSURE: 119 MMHG | DIASTOLIC BLOOD PRESSURE: 68 MMHG | OXYGEN SATURATION: 100 % | HEART RATE: 60 BPM | TEMPERATURE: 98.06 F

## 2017-10-20 VITALS
TEMPERATURE: 98.42 F | HEART RATE: 54 BPM | SYSTOLIC BLOOD PRESSURE: 109 MMHG | OXYGEN SATURATION: 99 % | DIASTOLIC BLOOD PRESSURE: 55 MMHG

## 2017-10-20 VITALS
HEART RATE: 60 BPM | TEMPERATURE: 98.06 F | DIASTOLIC BLOOD PRESSURE: 68 MMHG | SYSTOLIC BLOOD PRESSURE: 119 MMHG | OXYGEN SATURATION: 100 %

## 2017-10-20 VITALS
TEMPERATURE: 98.24 F | DIASTOLIC BLOOD PRESSURE: 68 MMHG | OXYGEN SATURATION: 99 % | HEART RATE: 64 BPM | SYSTOLIC BLOOD PRESSURE: 118 MMHG

## 2017-10-20 VITALS
OXYGEN SATURATION: 100 % | DIASTOLIC BLOOD PRESSURE: 60 MMHG | TEMPERATURE: 98.24 F | HEART RATE: 46 BPM | SYSTOLIC BLOOD PRESSURE: 115 MMHG

## 2017-10-20 VITALS — SYSTOLIC BLOOD PRESSURE: 115 MMHG | OXYGEN SATURATION: 100 % | HEART RATE: 46 BPM | DIASTOLIC BLOOD PRESSURE: 60 MMHG

## 2017-10-20 LAB
ANION GAP SERPL CALC-SCNC: 5 MMOL/L (ref 3–11)
ANISOCYTOSIS BLD QL SMEAR: PRESENT
BASOPHILS # BLD: 0.03 K/UL (ref 0–0.2)
BASOPHILS NFR BLD: 0.5 %
BUN SERPL-MCNC: 5 MG/DL (ref 7–18)
BUN/CREAT SERPL: 5.8 (ref 10–20)
CALCIUM SERPL-MCNC: 9.1 MG/DL (ref 8.5–10.1)
CHLORIDE SERPL-SCNC: 109 MMOL/L (ref 98–107)
CO2 SERPL-SCNC: 26 MMOL/L (ref 21–32)
COMPLETE: YES
CREAT CL PREDICTED SERPL C-G-VRATE: 147 ML/MIN
CREAT SERPL-MCNC: 0.88 MG/DL (ref 0.6–1.4)
DACRYOCYTES BLD QL SMEAR: (no result)
EOSINOPHIL NFR BLD AUTO: 222 K/UL (ref 130–400)
GLUCOSE SERPL-MCNC: 75 MG/DL (ref 70–99)
HCT VFR BLD CALC: 27 % (ref 42–52)
HCT VFR BLD CALC: 27.3 % (ref 42–52)
HYPOCHROMIA BLD QL SMEAR: PRESENT
IG%: 0.5 %
IMM GRANULOCYTES NFR BLD AUTO: 31.2 %
LYMPHOCYTES # BLD: 2.01 K/UL (ref 1.2–3.4)
MCH RBC QN AUTO: 15.1 PG (ref 25–34)
MCHC RBC AUTO-ENTMCNC: 27.4 G/DL (ref 32–36)
MCV RBC AUTO: 55.2 FL (ref 80–100)
MICROCYTES BLD QL SMEAR: PRESENT
MONOCYTES NFR BLD: 18.8 %
NEUTROPHILS # BLD AUTO: 4.8 %
NEUTROPHILS NFR BLD AUTO: 44.2 %
PLATELET # BLD EST: NORMAL 10*3/UL
POLYCHROMASIA BLD QL SMEAR: (no result)
POTASSIUM SERPL-SCNC: 3.8 MMOL/L (ref 3.5–5.1)
RBC # BLD AUTO: 4.89 M/UL (ref 4.7–6.1)
SCHISTOCYTES BLD QL SMEAR: (no result)
SODIUM SERPL-SCNC: 140 MMOL/L (ref 136–145)
WBC # BLD AUTO: 6.45 K/UL (ref 4.8–10.8)

## 2017-10-20 PROCEDURE — 0DB68ZX EXCISION OF STOMACH, VIA NATURAL OR ARTIFICIAL OPENING ENDOSCOPIC, DIAGNOSTIC: ICD-10-PCS | Performed by: SPECIALIST

## 2017-10-20 PROCEDURE — 0DB98ZX EXCISION OF DUODENUM, VIA NATURAL OR ARTIFICIAL OPENING ENDOSCOPIC, DIAGNOSTIC: ICD-10-PCS | Performed by: SPECIALIST

## 2017-10-20 RX ADMIN — PANTOPRAZOLE SODIUM SCH MLS/HR: 40 INJECTION, POWDER, FOR SOLUTION INTRAVENOUS at 18:13

## 2017-10-20 RX ADMIN — PANTOPRAZOLE SODIUM SCH MLS/HR: 40 INJECTION, POWDER, FOR SOLUTION INTRAVENOUS at 10:15

## 2017-10-20 RX ADMIN — PANTOPRAZOLE SODIUM SCH MLS/HR: 40 INJECTION, POWDER, FOR SOLUTION INTRAVENOUS at 05:02

## 2017-10-20 RX ADMIN — PANTOPRAZOLE SODIUM SCH MLS/HR: 40 INJECTION, POWDER, FOR SOLUTION INTRAVENOUS at 20:00

## 2017-10-20 NOTE — GI REPORT
Procedure Date: 10/20/2017 4:11 PM

Procedure:            Upper GI endoscopy

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 

                      Unexplained iron deficiency anemia

Medicines:            Propofol per Anesthesia

Complications:        No immediate complications. Estimated blood loss: 

                      Minimal.

Estimated Blood Loss: Estimated blood loss was minimal.

Procedure:            Pre-Anesthesia Assessment:

                      - Prior to the procedure, a History and Physical was 

                      performed, and patient medications and allergies were 

                      reviewed. The patient's tolerance of previous 

                      anesthesia was also reviewed. The risks and benefits of 

                      the procedure and the sedation options and risks were 

                      discussed with the patient. All questions were 

                      answered, and informed consent was obtained. Prior 

                      Anticoagulants: The patient has taken no previous 

                      anticoagulant or antiplatelet agents. ASA Grade 

                      Assessment: II - A patient with mild systemic disease. 

                      After reviewing the risks and benefits, the patient was 

                      deemed in satisfactory condition to undergo the 

                      procedure.

                      After obtaining informed consent, the endoscope was 

                      passed under direct vision. Throughout the procedure, 

                      the patient's blood pressure, pulse, and oxygen 

                      saturations were monitored continuously. The Scope was 

                      introduced through the mouth, and advanced to the third 

                      part of duodenum. The upper GI endoscopy was 

                      accomplished without difficulty. The patient tolerated 

                      the procedure well.

Findings:

     The examined esophagus was normal.

     One no bleeding angioectasia was found in the gastric fundus, in the 

     gastric body, on the greater curvature of the stomach and on the lesser 

     curvature of the stomach. Biopsies were taken with a cold forceps for 

     histology. Area was successfully injected with 1 mL of a 1:10,000 

     solution of epinephrine for hemostasis of bleeding caused by the 

     procedure.

     The gastric body and gastric antrum were normal. Biopsies were taken 

     with a cold forceps for histology. Estimated blood loss was minimal.

     The examined duodenum was normal. Biopsies for histology were taken with 

     a cold forceps for evaluation of celiac disease. Estimated blood loss 

     was minimal. Verification of patient identification for the specimen was 

     done by the physician and technician using the patient's name and 

     medical record number.

     The cardia and gastric fundus were normal on retroflexion.

Impression:           - Normal esophagus.

                      - One non-bleeding angioectasia in the stomach. 

                      Biopsied. Injected.

                      - Normal gastric body and antrum. Biopsied.

                      - Normal examined duodenum. Biopsied.

Recommendation:       - Return patient to hospital [facility] for ongoing care.

                      - Advance diet as tolerated.

                      - Perform a colonoscopy at appointment to be scheduled.

                      - Inpt/output mesenteric/venous Dopplers to exclude 

                      vascular anomalies. See dictation on [facility]

MD Az Shetty MD

10/20/2017 5:44:16 PM

This report has been signed electronically.

Note Initiated On: 10/20/2017 4:11 PM

     I attest to the content of the Intraoperative Record and orders 

     documented therein, exceptions below

## 2017-10-20 NOTE — DISCHARGE INSTRUCTIONS
Discharge Instructions


Date of Service


Oct 20, 2017.





Admission


Reason for Admission:  Anemia, Positive Occult Stool Blood Test





Discharge


Discharge Diagnosis / Problem:  acute iron deficiency anemia





Discharge Goals


Goal(s):  Diagnostic testing, Therapeutic intervention





Activity Recommendations


Activity Limitations:  resume your previous activity





.





Instructions / Follow-Up


Instructions / Follow-Up


You were admitted with symptomatic anemia, which required the transfusion of 2 

units of blood and IV iron.  This helped improve your symptoms.


A scope was used to look at your upper GI system, and found 1 nonbleeding 

angiectasia.  A abdominal ultrasound was performed and found no other vascular 

abnormalities.


At time of discharge, you are being prescribed pantoprazole 40 mg to be taken 

daily.  Your are advised to avoid any aspirin or NSAID such as ibuprofen/Advil, 

naproxen, Aleve, Motrin etc.


Follow-up will be arranged as follows:


- Colonoscopy next week


- Follow up with gastroenterologist next 1-2 weeks, post colonoscopy


If you do not receive a phone call with appointment times by Sunday midday, 

please call back to confirm timings.


Thank you for allowing to participate in your care.





Current Hospital Diet


Patient's current hospital diet: Low Lactose Diet





Discharge Diet


Recommended Diet:  Low Lactose Diet





Procedures


Procedures Performed:  


EGD, BX





Pending Studies


Studies pending at discharge:  yes


List of pending studies:  


Bx results





Medical Emergencies








.


Who to Call and When:





Medical Emergencies:  If at any time you feel your situation is an emergency, 

please call 911 immediately.





.





Non-Emergent Contact


Non-Emergency issues call your:  Primary Care Provider, Gastroenterologist





.


.








"Provider Documentation" section prepared by Ryanne Yadav.








.





VTE Core Measure


Inpt VTE Proph given/why not?:  SCD's





Resident Tracking


Resident Involvement:  Resident Care Provided


Care Provided:  Adult Hospital Medicine

## 2017-10-20 NOTE — DIAGNOSTIC IMAGING REPORT
DUPLEX MESENTERIC ULTRASOUND



CLINICAL HISTORY: telangectasia. r/o vascular anomalies heme-positive stools.

Anemia.



COMPARISON STUDY:  No previous studies for comparison.



FINDINGS: The peak systolic velocity within the aorta was 138 cm/s. The peak

systolic velocity of the celiac artery was 233 cm/s. Peak systolic velocity

within the superior mesenteric artery was 147 cm/s. Peak systolic velocity

within the right renal artery was 74 cm/s. Peak systolic velocity within the

left renal artery was 71 cm/s. Peak systolic velocity within the inferior

mesenteric artery was 215 cm/s.



IMPRESSION:  No arterial abnormalities identified. 









Electronically signed by:  Jerod Cole M.D.

10/20/2017 7:19 PM



Dictated Date/Time:  10/20/2017 7:12 PM

## 2017-10-20 NOTE — FAMILY MEDICINE PROGRESS NOTE
Progress Note


Date of Service


Oct 20, 2017.





Subjective


Pt evaluation today including:  conversation w/ patient, conversation w/ family

, physical exam, chart review, lab review, review of studies, conversation w/ 

consultant, review of inpatient medication list


Patient continues to feel better. He is breathing well without any heart racing 

or palpitations, she previously experienced.  He otherwise denies headaches, 

lightheadedness, chest pain, numbness and tingling.  Patient states he feels 

hungry but understands why he is being kept nothing by mouth.  He is voiding 

but has not had any bowel movements.  He denies feeling constipated.  

Management plans were explained to patient and his mother and questions were 

answered to her satisfaction.





   All Other Systems:  Reviewed and Negative





Objective


Vital Signs











  Date Time  Temp Pulse Resp B/P (MAP) Pulse Ox O2 Delivery O2 Flow Rate FiO2


 


10/20/17 14:13 36.8 103 16 127/69 (88) 95 Room Air  


 


10/20/17 13:49 36.7 60 19 119/68 100 Room Air 0.0 


 


10/20/17 12:07      Room Air  


 


10/20/17 11:12 36.7 60 19 119/68 (85) 100 Room Air  


 


10/20/17 08:00      Room Air  


 


10/20/17 07:37 36.9 54 18 109/55 (73) 99 Room Air  


 


10/20/17 04:12 36.8 64 20 118/68 (85) 99 Room Air  


 


10/20/17 04:00      Room Air  


 


10/19/17 23:59      Room Air  


 


10/19/17 23:52 36.8 57 20 102/72 (82) 100 Room Air  


 


10/19/17 20:00      Room Air  


 


10/19/17 19:01 36.8 55 16 118/71 (87) 100 Nasal Cannula  











Physical Exam


General Appearance:  WD/WN, no apparent distress


Eyes:  + pertinent finding (conjunctival pallor)


ENT:  hearing grossly normal


Neck:  supple


Respiratory/Chest:  lungs clear, normal breath sounds, no respiratory distress, 

no accessory muscle use


Cardiovascular:  regular rate, rhythm, no edema, no murmur


Abdomen:  normal bowel sounds, non tender, soft


Extremities:  no pedal edema, no calf tenderness


Neurologic/Psychiatric:  alert, normal mood/affect, oriented x 3


Skin:  normal color, warm/dry, no rash





Laboratory Results





Results Past 24 Hours








Test


  10/19/17


16:08 10/19/17


21:56 10/20/17


06:23 10/20/17


14:00 Range/Units


 


 


Hemoglobin 7.4 7.3 7.4  14.0-18.0  g/dL


 


Hematocrit 26.5 26.7 27.0  42-52  %


 


White Blood Count   6.45  4.8-10.8  K/uL


 


Red Blood Count   4.89  4.7-6.1  M/uL


 


Mean Corpuscular Volume   55.2    fL


 


Mean Corpuscular Hemoglobin   15.1  25-34  pg


 


Mean Corpuscular Hemoglobin


Concent 


  


  27.4


  


  32-36  g/dl


 


 


Platelet Count   222  130-400  K/uL


 


Neutrophils (%) (Auto)   44.2   %


 


Lymphocytes (%) (Auto)   31.2   %


 


Monocytes (%) (Auto)   18.8   %


 


Eosinophils (%) (Auto)   4.8   %


 


Basophils (%) (Auto)   0.5   %


 


Neutrophils # (Auto)   2.86  1.4-6.5  K/uL


 


Lymphocytes # (Auto)   2.01  1.2-3.4  K/uL


 


Monocytes # (Auto)   1.21  0.11-0.59  K/uL


 


Eosinophils # (Auto)   0.31  0-0.5  K/uL


 


Basophils # (Auto)   0.03  0-0.2  K/uL


 


RDW Standard Deviation   44.7  36.4-46.3  fL


 


RDW Coefficient of Variation   25.8  11.5-14.5  %


 


Immature Granulocyte % (Auto)   0.5   %


 


Immature Granulocyte # (Auto)   0.03  0.00-0.02  K/uL


 


Platelet Estimate   NORMAL   


 


Polychromasia   1+   


 


Hypochromasia   PRESENT   


 


Anisocytosis   PRESENT   


 


Microcytosis   PRESENT   


 


Tear Drop Cells   1+   


 


Schistocytes   1+   


 


Sodium Level   140  136-145  mmol/L


 


Potassium Level   3.8  3.5-5.1  mmol/L


 


Chloride Level   109    mmol/L


 


Carbon Dioxide Level   26  21-32  mmol/L


 


Anion Gap   5.0  3-11  mmol/L


 


Blood Urea Nitrogen   5  7-18  mg/dl


 


Creatinine


  


  


  0.88


  


  0.60-1.40


mg/dl


 


Est Creatinine Clear Calc


Drug Dose 


  


  147.0


  


   ml/min


 


 


Estimated GFR (


American) 


  


  143.3


  


   


 


 


Estimated GFR (Non-


American 


  


  123.7


  


   


 


 


BUN/Creatinine Ratio   5.8  10-20  


 


Random Glucose   75  70-99  mg/dl


 


Calcium Level   9.1  8.5-10.1  mg/dl











Assessment and Plan


21 yo M with h/o of gastritis admitted with symptomatic anemia, Hb 5.7, with a 

likely GI source considering grossly positive stool occult. Patient is 

consented for blood transfusion and Protonix drip initiated. 





Anemia - microcytic, iron studies consistent with iron deficiency anemia likely 

secondary to chronic GI losses. S/p transfusion of 2 units pRBC and IV Venofer 

300mg. Post transfusion Hb 7.5, and stable since then. GI consulted - recs 

appreciated


- Continue HH q8h 


- Continue Protonix drip 


- Patient NPO for endoscopy today





ADD


- [facility] held





DVT prophylaxis


- Chemical prophylaxis is contraindicated


- SCD


Continued [facility] stay due to:  other


Discharge planning:  home





Resident Tracking


Resident Involvement:  Resident Care Provided


Care Provided:  Pediatric Care (not )

## 2017-10-20 NOTE — DISCHARGE SUMMARY
Discharge Summary


Date of Service


Oct 20, 2017.


 (Iglesia Yadav MD)





Discharge Summary


Admission Date:


Oct 18, 2017 at 23:28


Discharge Date:  Oct 20, 2017


Discharge Disposition:  Home


Principal Diagnosis:  iron deficiency anemia


Immunizations:  


   Have You Had Influenza Vaccine:  Unknown


   History of Tetanus Vaccine?:  Unknown


   History of Pneumococcal:  Unknown


   History of Hepatitis B Vaccine:  Unknown


Procedures:


EGD with biopsies


Consultations:


Gastroenterology


 (Iglesia Yadav MD)





Medication Reconciliation


New Medications:  


Pantoprazole (Protonix) 40 Mg Tab


40 MG PO DAILY for 30 Days, #30 TAB 2 Refills





 


Continued Medications:  


Lisdexamfetamine Dimesylate (Vyvanse) 60 Mg Cap


60 MG PO DAILY, CAP











Discharge Exam


Patient continues to feel better. He is breathing well without any heart racing 

or palpitations, as he previously experienced.  He otherwise denies headaches, 

lightheadedness, chest pain, numbness and tingling.  He is keen for home.





   All Other Systems:  Reviewed and Negative





Physical Exam


General Appearance:  WD/WN, no apparent distress


Eyes:  + pertinent finding (conjunctival pallor)


ENT:  hearing grossly normal


Neck:  supple


Respiratory/Chest:  lungs clear, normal breath sounds, no respiratory distress, 

no accessory muscle use


Cardiovascular:  regular rate, rhythm, no edema, no murmur


Abdomen:  normal bowel sounds, non tender, soft


Extremities:  no pedal edema, no calf tenderness


Neurologic/Psychiatric:  alert, normal mood/affect, oriented x 3


Skin:  normal color, warm/dry, no rash


 (Iglesia Yadav MD)


no abdominal pain, rectal bleeding.


Review of Systems:  


   Constitutional:  No fever


   Respiratory:  No shortness of breath


   Cardiovascular:  No chest pain


Physical Exam:  


   General Appearance:  no apparent distress


   Respiratory/Chest:  lungs clear, no respiratory distress


   Cardiovascular:  regular rate, rhythm


   Abdomen / GI:  normal bowel sounds, non tender, soft


   Neurologic/Psychiatric:  alert, oriented x 3


   Skin:  warm/dry


 (Mandy Callejas M.D.)


Hospital Course


23 yo M with h/o of gastritis admitted with symptomatic anemia, Hb 5.7, with a 

likely GI source considering grossly positive stool occult. Patient is 

consented for blood transfusion and Protonix drip initiated. 





Anemia - microcytic, iron studies consistent with iron deficiency anemia likely 

secondary to chronic GI losses. S/p transfusion of 2 units pRBC and IV Venofer 

300mg. Post transfusion Hb 7.5, and stable since then. GI consulted - recs 

appreciated. EGD showed 1 nonbleeding area of diffuse angiodysplasia/

telangiectasia.  Biopsy of the site was performed, results are pending. 

Subsequent abdominal mesenteric ultrasound Doppler done to rule out vascular 

anomalies, but the report were unremarkable. 


- On discharge patient is prescribed pantoprazole 40mg daily, And told to avoid 

aspirin and NSAID usage.  Follow-up was arranged with gastroenterology for 

colonoscopy next week as well as an office visit after that.





ADD


- Continue Vyvance as needed





DVT prophylaxis


- Chemical prophylaxis is contraindicated


- SCD


Total Time Spent:  Less than 30 minutes


This includes examination of the patient, discharge planning, medication 

reconciliation, and communication with other providers.


 (Iglesia Yadav MD)


Resident Physician Supervision Note:


I independently interviewed and examined the patient and verified the key 

history and physical, reviewed labs and image studies, discussed the case with 

the resident Dr. Yadav and agree with the findings and care plan.


Total Time Spent:  Greater than 30 minutes (40)


 (Mandy Callejas M.D.)


Discharge Instructions


Please refer to the electronic Patient Visit Report (Discharge Instructions) 

for additional information.


 (Iglesia Yadav MD)





Additional Copies To


[facility]





Resident Tracking


Resident Involvement:  Resident Care Provided


Care Provided:  Adult Hospital Medicine


 (Iglesia Yadav MD)

## 2017-10-20 NOTE — ANESTHESIOLOGY PROGRESS NOTE
Anesthesia Post Op Note


Date & Time


Oct 20, 2017 at 16:59





Vital Signs


Pain Intensity:  0.0





Vital Signs Past 12 Hours








  Date Time  Temp Pulse Resp B/P (MAP) Pulse Ox O2 Delivery O2 Flow Rate FiO2


 


10/20/17 16:00      Room Air  


 


10/20/17 14:13 36.8 103 16 127/69 (88) 95 Room Air  


 


10/20/17 13:49 36.7 60 19 119/68 100 Room Air 0.0 


 


10/20/17 12:07      Room Air  


 


10/20/17 11:12 36.7 60 19 119/68 (85) 100 Room Air  


 


10/20/17 08:00      Room Air  


 


10/20/17 07:37 36.9 54 18 109/55 (73) 99 Room Air  











Notes


Mental Status:  alert / awake / arousable, participated in evaluation


Pt Amnestic to Procedure:  Yes


Nausea / Vomiting:  adequately controlled


Pain:  adequately controlled


Airway Patency, RR, SpO2:  stable & adequate


BP & HR:  stable & adequate


Hydration State:  stable & adequate


Anesthetic Complications:  no major complications apparent

## 2017-10-20 NOTE — GASTROINTESTINAL CONSULTATION
DATE OF CONSULTATION:  10/19/2017

 

CHIEF COMPLAINT:  Anemia and syncope.

 

HISTORY OF PRESENT ILLNESS:  Mr. Mane is a 22-year-old -American

male who had reported an approximate 1 year history, since last Thanksgiving,

of stomach upset.  Initially, this was thought to be gastritis and he was

placed on Zantac, which did relieve his symptoms, although over the year, the

patient had recurrent features.  He was watching basketball with his friends

and had arisen quickly and became syncopal where he believes he passed out

for about 2 minutes.  The patient has not described hematemesis,

coffee-ground emesis, melena or bright red blood per rectum, although perhaps

over the last month, he has noticed that his stools were slightly

darker, but not described as black or tarry.  The patient has also noticed an

increase in fatigue level.

 

The fatigue has been most prominent over the past couple of months

since returning back to school.  His usual exercise tolerance is to bike to

his school, classes and was active, but this seems to have changed recently. 

The patient denies any abdominal pain at the present time and has not used

any significant amounts of NSAIDs, perhaps commenting that he has only had 3

doses of ibuprofen since starting school.  He does not use chronic

medications.

 

The patient did have episodes of vomiting last year, but no significant

GI symptoms since that time.  The patient denies use of alcoholic beverages.

 

The patient has no significant past medical or surgical history.

 

SOCIAL HISTORY:  The patient denies tobacco use, does not use alcoholic

beverages.  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

engineering and has no children.

 

ALLERGIES:  He has no known drug allergies.

 

MEDICATIONS:  His only medication is Vyvanse for ADD.

 

FAMILY HISTORY:  Significant for a maternal aunt with PEBBLES and

maternal great aunt with inflammatory bowel disease.

 

REVIEW OF SYSTEMS:  Otherwise noncontributory based on 13-point exam except

for mentioned above.  The patient denies any mouth ulcers, joint aches,

rashes, changes in his stool pattern such that he does not report chronic

diarrhea either constant or intermittent, does not have constipation and had

no features of bowel obstructions over the years.  There are no rashes

reported.

 

PHYSICAL EXAMINATION:

VITAL SIGNS:  Today after receiving blood products, his vital signs are

afebrile at 36.8, blood pressure 118/71, respirations 16, pulse 55, 100% on

room air.

GENERAL:  The patient is awake, alert and oriented x3.

HEENT:  Sclerae are anicteric, conjunctiva moist.  Oral mucosa moist.

NECK:  There is no cervical or supraclavicular adenopathy.  I do not

appreciate thyromegaly.

HEART:  Normal S1, S2.

LUNGS:  Clear to auscultation without rales, rhonchi or wheezes.

ABDOMEN:  Soft, flat, nontender, nondistended with normal active bowel

sounds.  There is no rebound or guarding.  I do not appreciate

hepatosplenomegaly.

EXTREMITIES:  Without clubbing, cyanosis or edema.

RECTAL:  Deferred.

 

LABORATORY STUDIES:  On admission showed a hemoglobin of 6.5, MCV 51.1. 

White count is 7.8, platelets 255,000.

 

The patient has received 2 units of packed red blood cells.

 

Other laboratories include a normal BUN and creatinine of 8 and 0.9. 

Potassium 3.5.  Serum iron is low at 6, , transferrin 324, transferrin

saturation 1%, ferritin level 1.2%.  Normal liver function tests, bilirubin

0.4, AST 21, ALT 14, alkaline phosphatase 53.  B12 509, folic acid 23.

 

The patient Lyme's disease titers were negative.  Celiac markers were

obtained, the IgA is normal at 154.  TTG is pending.

 

Imaging studies were of the head and neck, which were unremarkable including

CT of the head and spine.

 

IMPRESSION AND PLAN:  The patient with evidence of profound chronic iron

deficiency anemia without reports of significant melena, bright red blood per

rectum and except for some symptoms last year, which have not completely

resolved, but are not as profound.  There has been no other gastrointestinal

complaints by way of diarrhea or chronic symptoms.  Differential diagnosis

includes peptic ulcer disease, gastritis, possible AV malformation.

 

There is a family history of Crohn's disease and this may be in the

differential, although the patient himself does not have many features of

inflammatory bowel disease in the absence of diarrhea, bright red blood,

maroon stools or andrea melena.

 

I made the following recommendations.

 

I planned for an upper endoscopy on Friday to exclude these as sources given

his initial symptoms of upper GI features last year and intermittently

throughout the past year.  Depending on these results, colonoscopy is

recommended.  Await the results of the celiac markers.  I did speak with the

patient and his mother and there is no family history of sickle cell disease.

 Further recommendations to follow.  Would continue PPI therapy, keep the

patient n.p.o. after midnight except for medications for upper endoscopy. 

Depending on his response and how he does over the weekend, colonoscopy is

likely necessary.  All questions answered.

 

 

 

CARMEL

## 2017-10-21 NOTE — GASTROENTEROLOGY PROGRESS NOTE
DATE: 10/20/2017

 

Addendum

 

The patient underwent an upper endoscopy with a normal-appearing esophagus

and lower half of the stomach and duodenum.  Biopsies were taken from the

distal stomach and do it duodenum.  However, in the proximal half of the

stomach, there is a fine pattern of diffuse angiodysplasia/telangiectasia

present.  One area was biopsied.  The source of this pattern is unclear, but

may reflect a pattern of hereditary telangiectasia.  There is no family

history however of this condition according to the patient and his mother and

the patient has not reported other symptoms such as frequent epistaxis.

 

I made the following recommendations:  The patient will need a colonoscopy as

an outpatient.  We will arrange for this upcoming weak.  Additionally, the

patient will need an office visit and we will consider hemoglobin

electrophoresis to exclude sickle cell disease, although there is no family

history of disease or trait according to the patient's mother.  If the

patient is tolerating liquid, the patient may be discharged this evening or

early this morning.  Today, his hemoglobin is stable again at 7.4 and seemed

to have a reasonable response to 2 units of packed red blood cells as he

presented with a hemoglobin of 5.1 with a markedly diminished MCV of 51.  All

questions answered for the patient and his mother.  We will make arrangements

for outpatient testing.  Additionally, a mesenteric Doppler was ordered to

exclude any vascular anomalies and this was unrevealing.  



It may be necessary

to also perform portal Dopplers to exclude any venous congestion or venous

flow abnormalities that may be responsible for this pattern, although this is

an asymmetric distribution affecting the proximal half of the stomach.  There 
is a

family history of ulcerative colitis with the patient's maternal aunt and

therefore, colonoscopy is warranted.  Hb electropahresis is also recommended.



All questions answered.

 

 

 

 

MTDD

## 2018-03-28 ENCOUNTER — HOSPITAL ENCOUNTER (EMERGENCY)
Dept: HOSPITAL 45 - C.EDB | Age: 23
Discharge: HOME | End: 2018-03-28
Payer: COMMERCIAL

## 2018-03-28 DIAGNOSIS — R06.02: Primary | ICD-10-CM

## 2018-03-28 DIAGNOSIS — Z82.49: ICD-10-CM

## 2018-03-28 DIAGNOSIS — R10.13: ICD-10-CM

## 2018-03-28 DIAGNOSIS — D50.9: ICD-10-CM

## 2018-03-28 DIAGNOSIS — R19.4: ICD-10-CM

## 2018-03-29 LAB
BASOPHILS # BLD: 0.02 K/UL (ref 0–0.2)
BASOPHILS NFR BLD: 0.4 %
BUN SERPL-MCNC: 9 MG/DL (ref 7–18)
CALCIUM SERPL-MCNC: 9.2 MG/DL (ref 8.5–10.1)
CO2 SERPL-SCNC: 27 MMOL/L (ref 21–32)
CREAT SERPL-MCNC: 1.01 MG/DL (ref 0.6–1.4)
EOS ABS #: 0.23 K/UL (ref 0–0.5)
EOSINOPHIL NFR BLD AUTO: 234 K/UL (ref 130–400)
GLUCOSE SERPL-MCNC: 73 MG/DL (ref 70–99)
HCT VFR BLD CALC: 37.8 % (ref 42–52)
HGB BLD-MCNC: 11.5 G/DL (ref 14–18)
IG#: 0.01 K/UL (ref 0–0.02)
IMM GRANULOCYTES NFR BLD AUTO: 38.6 %
LYMPHOCYTES # BLD: 2.07 K/UL (ref 1.2–3.4)
MCH RBC QN AUTO: 19.8 PG (ref 25–34)
MCHC RBC AUTO-ENTMCNC: 30.4 G/DL (ref 32–36)
MCV RBC AUTO: 65.2 FL (ref 80–100)
MONO ABS #: 0.72 K/UL (ref 0.11–0.59)
MONOCYTES NFR BLD: 13.4 %
NEUT ABS #: 2.31 K/UL (ref 1.4–6.5)
NEUTROPHILS # BLD AUTO: 4.3 %
NEUTROPHILS NFR BLD AUTO: 43.1 %
POTASSIUM SERPL-SCNC: 3.4 MMOL/L (ref 3.5–5.1)
RED CELL DISTRIBUTION WIDTH CV: 17.2 % (ref 11.5–14.5)
RED CELL DISTRIBUTION WIDTH SD: 40.1 FL (ref 36.4–46.3)
SODIUM SERPL-SCNC: 138 MMOL/L (ref 136–145)
WBC # BLD AUTO: 5.36 K/UL (ref 4.8–10.8)

## 2018-03-29 NOTE — DIAGNOSTIC IMAGING REPORT
CHEST ONE VIEW PORTABLE



CLINICAL HISTORY: CHEST PAIN dyspnea



COMPARISON STUDY:  No previous studies for comparison.



FINDINGS: The bones soft tissues and hemidiaphragms are normal. The

cardiomediastinal silhouette is normal. The lungs are clear. The pulmonary

vasculature is normal. 



IMPRESSION:  Negative chest. 











The above report was generated using voice recognition software.  It may contain

grammatical, syntax or spelling errors.









Electronically signed by:  Chacho Knox M.D.

3/29/2018 6:51 AM



Dictated Date/Time:  3/29/2018 6:51 AM

## 2018-03-29 NOTE — EMERGENCY ROOM VISIT NOTE
History


Report prepared by Naman:  Lidia Hanson


Under the Supervision of:  Dr. Jamaal Russ M.D.


Chief Complaint:  SHORTNESS OF BREATH


Stated Complaint:  STOMACH PAINS,SOB,HEADACHES





History of Present Illness


The patient is a 23 year old male who presents to the Emergency Room with 

complaints of worsening SOB starting 2 weeks ago. The patient was seen in the 

ED previously after a syncopal episode and was found to be anemic and iron 

deficient. He followed up with GI for a GI bleed. He was started on an iron 

supplement. He was on a medicine for stomach acid. Over the past 2 weeks, he is 

starting to feel like he did prior to his previous syncopal episode. He spoke 

with Dr. Holloway and was told to increase his iron. He is increasingly SOB with 

exertion and lightheaded. He is having migraines when he studies. He feels weak 

and fatigued more than usual. He notes some darker stools recently. He is 

having abdominal pain after eating. He has had slight chest pain. He is a [employer] 

student. He smokes occasionally and drinks rarely. He denies any recent travel. 

He does not have any other medical problems. Pt denies LOC, fevers, chills, 

diaphoresis, visual changes, neck pain, nausea, vomiting, back pain, 

hematochezia, urinary symptoms, numbness, lymphadenopathy, rash, or other 

complaints.





   Source of History:  patient


   Onset:  2 weeks ago


   Position:  other (breathing)


   Quality:  other (SOB)


   Timing:  worsening


   Associated Symptoms:  + headache, + chest pain, + abdominal pain, + fatigue, 

+ weakness





Review of Systems


See HPI for pertinent positives and negatives.  A total of ten systems were 

reviewed and were otherwise negative.





Past Medical & Surgical


Medical Problems:


(1) Anemia


(2) Positive occult stool blood test


Surgical Problems:


(1) S/P tonsillectomy








Family History





Cancer


Heart disease





Social History


Smoking Status:  Current Some Day Smoker


Drug Use:  marijuana


Marital Status:  single


Housing Status:  lives with roommate


Occupation Status:  [employer] student





Current/Historical Medications


Scheduled


Lisdexamfetamine Dimesylate (Vyvanse), 60 MG PO QAM


Pantoprazole (Protonix), 40 MG PO QAM





Allergies


Coded Allergies:  


     No Known Allergies (Verified , 11/3/17)





Physical Exam


Physical Exam


GENERAL: Awake, alert, anxious-appearing, in no distress


HENT: Normocephalic, atraumatic. Oropharynx unremarkable.


EYES: Normal conjunctiva. Sclera non-icteric.


NECK: Supple. No nuchal rigidity. FROM. No masses.


RESPIRATORY: Clear to auscultation. No wheezes. No rales.  Normal respiratory 

effort.


CARDIAC: Normal rate.  Normal rhythm. No murmurs.  No rubs. Extremities warm 

and well perfused. Pulses equal.  No JVD.


GI: Soft, non-distended. Epigastric tenderness to palpation. No rebound or 

guarding. No masses.


RECTAL: Deferred.


MUSCULOSKELETAL: Atraumatic. Chest examination reveals no tenderness. The back 

is symmetrical on inspection without obvious abnormality. There is no CVA 

tenderness to palpation. No joint edema. 


LOWER EXTREMITIES: Calves are equal size bilaterally and non-tender. No edema. 

No discoloration. 


NEURO: Normal sensorium. No sensory or motor deficits noted. 


SKIN: No rash or jaundice noted.





Medical Decision & Procedures


ER Provider


Diagnostic Interpretation:


Chest x-ray. Findings: A chest x-ray was performed and revealed no pneumothorax

, effusion, infiltrate, pulmonary edema, free air under the diaphragm, or wide 

mediastinum.





ECG Per My Interpretation


Indication:  SOB/dyspnea


Rate (beats per minute):  53


Rhythm:  sinus bradycardia (with sinus arrhythmia)


Findings:  no acute ischemic change, no ectopy, other (LVH)





ED Course


1854: The patient was evaluated in room B2. A complete history and physical 

exam was performed.





2032: I reevaluated the patient. I updated him on the results. 





2135: I reevaluated the patient. He is doing well. 





2212: I reevaluated the patient. Discussed results and discharge instructions: 

He verbalized understanding and agreement. The patient is ready for discharge.





Medical Decision


Prior records/ancillary studies reviewed.  Significant anemia noted on prior 

labs.


Triage Nursing notes reviewed and agree them.


The patient's history was concerning for  shortness of breath and history of 

anemia.   





Differential diagnosis:


Etiologies such as symptomatic anemia, GI bleed, pneumonia, COPD, reactive 

airway disease, CHF, cardiac ischemia, pulmonary embolism, pneumothorax, 

musculoskeletal, infections, gastrointestinal, as well as others were 

entertained.  





Physical examination:


The patient was evaluated.  He had normal vital signs.  He had a benign abdomen.





ER treatment provided:


Observation and reassurance


On reassessment the patient felt better.


Albuterol MDI





Diagnostic interpretation by me:


The electrocardiogram was negative for pathologic change. 





The labs revealed a normal white blood cell count.  The patient has a slight 

anemia with a hemoglobin of 11.5.  Microcytic anemia suggested by differential.

  Troponin negative.  Chemistry panel negative.  The patient's d-dimer was also 

negative.





Imaging studies:


Chest x-ray as above.





The patient was reassessed and reassured.  An ambulatory pulse ox was performed 

and he was 100%.  He had no tachycardia.  He does note that he may have some 

anxiety and symptoms that flareup prior to academic issues such as tests.  He 

does have a test tomorrow.  He is not currently taking a PPI.  He will be 

started on a PPI.  The patient will follow up with GI.  He was offered an 

albuterol MDI for symptomatic treatment.  He will have a close follow-up with 

his primary clinic.


By the evaluation outlined above emergent etiologies such as CHF, cardiac 

ischemia,  pulmonary embolism, reactive airway disease, pneumonia, pneumothorax

, musculoskeletal, severe anemia, infections, as well as others were deemed 

relatively unlikely.  





The patient was informed about the findings as listed above.  All questions 

were answered and he was pleased with the treatment.  Return instructions were 

outlined and the patient was discharged in stable condition.  





Outpatient prescription management:


Prilosec


Albuterol MDI





Impression





 Primary Impression:  


 SOB (shortness of breath)


 Additional Impression:  


 Anemia





Scribe Attestation


The scribe's documentation has been prepared under my direction and personally 

reviewed by me in its entirety. I confirm that the note above accurately 

reflects all work, treatment, procedures, and medical decision making performed 

by me.





Departure Information


Dispostion


Home / Self-Care





Referrals


[facility] (PCP)





Forms


HOME CARE DOCUMENTATION FORM,                                                 

               IMPORTANT VISIT INFORMATION





Patient Instructions


My [facility]





Problem Qualifiers